# Patient Record
Sex: MALE | Race: WHITE | NOT HISPANIC OR LATINO | Employment: FULL TIME | URBAN - METROPOLITAN AREA
[De-identification: names, ages, dates, MRNs, and addresses within clinical notes are randomized per-mention and may not be internally consistent; named-entity substitution may affect disease eponyms.]

---

## 2023-06-22 ENCOUNTER — LAB (OUTPATIENT)
Dept: LAB | Facility: LAB | Age: 63
End: 2023-06-22
Payer: COMMERCIAL

## 2023-06-22 ENCOUNTER — APPOINTMENT (OUTPATIENT)
Dept: PRIMARY CARE | Facility: CLINIC | Age: 63
End: 2023-06-22
Payer: COMMERCIAL

## 2023-06-22 ENCOUNTER — OFFICE VISIT (OUTPATIENT)
Dept: PRIMARY CARE | Facility: CLINIC | Age: 63
End: 2023-06-22
Payer: COMMERCIAL

## 2023-06-22 VITALS
HEART RATE: 70 BPM | HEIGHT: 66 IN | DIASTOLIC BLOOD PRESSURE: 74 MMHG | OXYGEN SATURATION: 97 % | BODY MASS INDEX: 24.89 KG/M2 | SYSTOLIC BLOOD PRESSURE: 132 MMHG | WEIGHT: 154.9 LBS

## 2023-06-22 DIAGNOSIS — Z00.00 ROUTINE GENERAL MEDICAL EXAMINATION AT A HEALTH CARE FACILITY: Primary | ICD-10-CM

## 2023-06-22 DIAGNOSIS — Z00.00 ROUTINE GENERAL MEDICAL EXAMINATION AT A HEALTH CARE FACILITY: ICD-10-CM

## 2023-06-22 PROBLEM — R53.83 FATIGUE: Status: ACTIVE | Noted: 2023-06-22

## 2023-06-22 PROBLEM — R55 VASOVAGAL SYNCOPE: Status: ACTIVE | Noted: 2021-02-03

## 2023-06-22 PROBLEM — K21.00 HIATAL HERNIA WITH GERD AND ESOPHAGITIS: Status: ACTIVE | Noted: 2023-06-22

## 2023-06-22 PROBLEM — K52.9 CHRONIC DIARRHEA: Status: ACTIVE | Noted: 2021-07-20

## 2023-06-22 PROBLEM — R10.9 ABDOMINAL PAIN: Status: RESOLVED | Noted: 2021-11-15 | Resolved: 2023-06-22

## 2023-06-22 PROBLEM — R35.1 NOCTURIA: Status: RESOLVED | Noted: 2023-06-22 | Resolved: 2023-06-22

## 2023-06-22 PROBLEM — K62.5 RECTAL BLEEDING: Status: RESOLVED | Noted: 2021-07-20 | Resolved: 2023-06-22

## 2023-06-22 PROBLEM — R35.1 NOCTURIA: Status: ACTIVE | Noted: 2023-06-22

## 2023-06-22 PROBLEM — R10.9 ABDOMINAL PAIN: Status: ACTIVE | Noted: 2021-11-15

## 2023-06-22 PROBLEM — Q21.12 PFO (PATENT FORAMEN OVALE) (HHS-HCC): Status: ACTIVE | Noted: 2021-02-03

## 2023-06-22 PROBLEM — D49.89 NEOPLASM OF LEG: Status: ACTIVE | Noted: 2023-06-22

## 2023-06-22 PROBLEM — K62.5 RECTAL BLEEDING: Status: ACTIVE | Noted: 2021-07-20

## 2023-06-22 PROBLEM — K44.9 HIATAL HERNIA WITH GERD AND ESOPHAGITIS: Status: ACTIVE | Noted: 2023-06-22

## 2023-06-22 PROBLEM — D48.9 NEOPLASM OF UNCERTAIN BEHAVIOR: Status: RESOLVED | Noted: 2023-06-22 | Resolved: 2023-06-22

## 2023-06-22 PROBLEM — R20.0 NUMBNESS: Status: RESOLVED | Noted: 2023-06-22 | Resolved: 2023-06-22

## 2023-06-22 PROBLEM — R20.0 NUMBNESS: Status: ACTIVE | Noted: 2023-06-22

## 2023-06-22 PROBLEM — R55 VASOVAGAL SYNCOPE: Status: RESOLVED | Noted: 2021-02-03 | Resolved: 2023-06-22

## 2023-06-22 PROBLEM — D49.89 NEOPLASM OF LEG: Status: RESOLVED | Noted: 2023-06-22 | Resolved: 2023-06-22

## 2023-06-22 PROBLEM — Q21.12 PFO (PATENT FORAMEN OVALE) (HHS-HCC): Status: RESOLVED | Noted: 2021-02-03 | Resolved: 2023-06-22

## 2023-06-22 PROBLEM — K52.9 CHRONIC DIARRHEA: Status: RESOLVED | Noted: 2021-07-20 | Resolved: 2023-06-22

## 2023-06-22 PROBLEM — D48.9 NEOPLASM OF UNCERTAIN BEHAVIOR: Status: ACTIVE | Noted: 2023-06-22

## 2023-06-22 PROBLEM — R53.83 FATIGUE: Status: RESOLVED | Noted: 2023-06-22 | Resolved: 2023-06-22

## 2023-06-22 LAB
ALANINE AMINOTRANSFERASE (SGPT) (U/L) IN SER/PLAS: 11 U/L (ref 10–52)
ALBUMIN (G/DL) IN SER/PLAS: 4.5 G/DL (ref 3.4–5)
ALKALINE PHOSPHATASE (U/L) IN SER/PLAS: 42 U/L (ref 33–136)
ANION GAP IN SER/PLAS: 11 MMOL/L (ref 10–20)
ASPARTATE AMINOTRANSFERASE (SGOT) (U/L) IN SER/PLAS: 15 U/L (ref 9–39)
BILIRUBIN TOTAL (MG/DL) IN SER/PLAS: 0.5 MG/DL (ref 0–1.2)
CALCIUM (MG/DL) IN SER/PLAS: 9 MG/DL (ref 8.6–10.3)
CARBON DIOXIDE, TOTAL (MMOL/L) IN SER/PLAS: 26 MMOL/L (ref 21–32)
CHLORIDE (MMOL/L) IN SER/PLAS: 101 MMOL/L (ref 98–107)
COBALAMIN (VITAMIN B12) (PG/ML) IN SER/PLAS: 486 PG/ML (ref 211–911)
CREATININE (MG/DL) IN SER/PLAS: 1.01 MG/DL (ref 0.5–1.3)
ERYTHROCYTE DISTRIBUTION WIDTH (RATIO) BY AUTOMATED COUNT: 13.7 % (ref 11.5–14.5)
ERYTHROCYTE MEAN CORPUSCULAR HEMOGLOBIN CONCENTRATION (G/DL) BY AUTOMATED: 33.4 G/DL (ref 32–36)
ERYTHROCYTE MEAN CORPUSCULAR VOLUME (FL) BY AUTOMATED COUNT: 88 FL (ref 80–100)
ERYTHROCYTES (10*6/UL) IN BLOOD BY AUTOMATED COUNT: 4.34 X10E12/L (ref 4.5–5.9)
GFR MALE: 84 ML/MIN/1.73M2
GLUCOSE (MG/DL) IN SER/PLAS: 83 MG/DL (ref 74–99)
HEMATOCRIT (%) IN BLOOD BY AUTOMATED COUNT: 38.3 % (ref 41–52)
HEMOGLOBIN (G/DL) IN BLOOD: 12.8 G/DL (ref 13.5–17.5)
LEUKOCYTES (10*3/UL) IN BLOOD BY AUTOMATED COUNT: 5.8 X10E9/L (ref 4.4–11.3)
MAGNESIUM (MG/DL) IN SER/PLAS: 1.94 MG/DL (ref 1.6–2.4)
PLATELETS (10*3/UL) IN BLOOD AUTOMATED COUNT: 262 X10E9/L (ref 150–450)
POTASSIUM (MMOL/L) IN SER/PLAS: 4.4 MMOL/L (ref 3.5–5.3)
PROSTATE SPECIFIC ANTIGEN,SCREEN: 0.45 NG/ML (ref 0–4)
PROTEIN TOTAL: 7 G/DL (ref 6.4–8.2)
SODIUM (MMOL/L) IN SER/PLAS: 134 MMOL/L (ref 136–145)
THYROTROPIN (MIU/L) IN SER/PLAS BY DETECTION LIMIT <= 0.05 MIU/L: 3.38 MIU/L (ref 0.44–3.98)
UREA NITROGEN (MG/DL) IN SER/PLAS: 16 MG/DL (ref 6–23)

## 2023-06-22 PROCEDURE — 84443 ASSAY THYROID STIM HORMONE: CPT

## 2023-06-22 PROCEDURE — 36415 COLL VENOUS BLD VENIPUNCTURE: CPT

## 2023-06-22 PROCEDURE — 80053 COMPREHEN METABOLIC PANEL: CPT

## 2023-06-22 PROCEDURE — 84153 ASSAY OF PSA TOTAL: CPT

## 2023-06-22 PROCEDURE — 82607 VITAMIN B-12: CPT

## 2023-06-22 PROCEDURE — 3075F SYST BP GE 130 - 139MM HG: CPT | Performed by: FAMILY MEDICINE

## 2023-06-22 PROCEDURE — 83735 ASSAY OF MAGNESIUM: CPT

## 2023-06-22 PROCEDURE — 1036F TOBACCO NON-USER: CPT | Performed by: FAMILY MEDICINE

## 2023-06-22 PROCEDURE — 85027 COMPLETE CBC AUTOMATED: CPT

## 2023-06-22 PROCEDURE — 99396 PREV VISIT EST AGE 40-64: CPT | Performed by: FAMILY MEDICINE

## 2023-06-22 PROCEDURE — 3078F DIAST BP <80 MM HG: CPT | Performed by: FAMILY MEDICINE

## 2023-06-22 RX ORDER — TRAZODONE HYDROCHLORIDE 50 MG/1
TABLET ORAL
Qty: 30 TABLET | Refills: 1 | Status: SHIPPED | OUTPATIENT
Start: 2023-06-22

## 2023-06-22 RX ORDER — HYDROGEN PEROXIDE 3 %
20 SOLUTION, NON-ORAL MISCELLANEOUS
COMMUNITY

## 2023-06-22 RX ORDER — ADALIMUMAB 40MG/0.4ML
40 KIT SUBCUTANEOUS
COMMUNITY
Start: 2023-04-03

## 2023-06-22 ASSESSMENT — PATIENT HEALTH QUESTIONNAIRE - PHQ9
1. LITTLE INTEREST OR PLEASURE IN DOING THINGS: NOT AT ALL
2. FEELING DOWN, DEPRESSED OR HOPELESS: NOT AT ALL
SUM OF ALL RESPONSES TO PHQ9 QUESTIONS 1 AND 2: 0

## 2023-06-22 ASSESSMENT — ENCOUNTER SYMPTOMS
NERVOUS/ANXIOUS: 0
HEMATURIA: 0
SLEEP DISTURBANCE: 1
DIZZINESS: 0
FATIGUE: 1
PALPITATIONS: 0
SHORTNESS OF BREATH: 0
DIFFICULTY URINATING: 0
COUGH: 0
CHEST TIGHTNESS: 0
HEADACHES: 0

## 2023-06-22 NOTE — PROGRESS NOTES
"Subjective   Patient ID: Brian Brock Jr. is a 62 y.o. male who presents for Annual Exam.    HPI   Doing better with the ulcerative colitis.  Follows with GI.  Sleep issues going back over a year.  Little trouble falling asleep but the biggest problem is once he wakes and then middle of the night going back to sleep.  No major changes in the last year.  Did have a colonoscopy within the last couple years.  Had basic blood test done a lot in the last 2 years.  TSH PSA were checked January 2021.  No major symptom change, add PSA    Labs today  Start trazodone 1 to 2 pills at bedtime  Office visit 1 month    Review of Systems   Constitutional:  Positive for fatigue.   Eyes:  Negative for visual disturbance.   Respiratory:  Negative for cough, chest tightness and shortness of breath.    Cardiovascular:  Negative for chest pain and palpitations.   Genitourinary:  Negative for difficulty urinating and hematuria.   Skin:  Negative for rash.   Neurological:  Negative for dizziness and headaches.   Psychiatric/Behavioral:  Positive for sleep disturbance. The patient is not nervous/anxious.        Objective   /74   Pulse 70   Ht 1.676 m (5' 6\")   Wt 70.3 kg (154 lb 14.4 oz)   SpO2 97%   BMI 25.00 kg/m²     Physical Exam  Constitutional:       Appearance: Normal appearance.   HENT:      Head: Normocephalic and atraumatic.   Neck:      Thyroid: No thyromegaly.   Cardiovascular:      Rate and Rhythm: Normal rate and regular rhythm.      Heart sounds: Normal heart sounds.   Pulmonary:      Effort: Pulmonary effort is normal.      Breath sounds: Normal breath sounds.   Lymphadenopathy:      Cervical: No cervical adenopathy.   Skin:     General: Skin is warm and dry.   Neurological:      General: No focal deficit present.      Mental Status: He is alert and oriented to person, place, and time.      Gait: Gait normal.   Psychiatric:         Mood and Affect: Mood normal.         Behavior: Behavior normal.         " Thought Content: Thought content normal.         Judgment: Judgment normal.         Assessment/Plan   Problem List Items Addressed This Visit    None  Visit Diagnoses       Routine general medical examination at a health care facility    -  Primary    Relevant Medications    traZODone (Desyrel) 50 mg tablet    Other Relevant Orders    CBC    Comprehensive Metabolic Panel    Thyroid Stimulating Hormone    Vitamin B12    Magnesium    Prostate Specific Antigen, Screen

## 2023-06-26 ENCOUNTER — TELEPHONE (OUTPATIENT)
Dept: PRIMARY CARE | Facility: CLINIC | Age: 63
End: 2023-06-26
Payer: COMMERCIAL

## 2023-07-28 ENCOUNTER — APPOINTMENT (OUTPATIENT)
Dept: PRIMARY CARE | Facility: CLINIC | Age: 63
End: 2023-07-28
Payer: COMMERCIAL

## 2025-04-11 ENCOUNTER — APPOINTMENT (OUTPATIENT)
Age: 65
End: 2025-04-11
Payer: COMMERCIAL

## 2025-04-11 VITALS
BODY MASS INDEX: 24.85 KG/M2 | HEIGHT: 66 IN | DIASTOLIC BLOOD PRESSURE: 80 MMHG | WEIGHT: 154.6 LBS | SYSTOLIC BLOOD PRESSURE: 162 MMHG | OXYGEN SATURATION: 99 % | HEART RATE: 64 BPM

## 2025-04-11 DIAGNOSIS — Q21.12 PATENT FORAMEN OVALE (HHS-HCC): ICD-10-CM

## 2025-04-11 DIAGNOSIS — R00.2 HEART PALPITATIONS: Primary | ICD-10-CM

## 2025-04-11 DIAGNOSIS — I10 ESSENTIAL HYPERTENSION: ICD-10-CM

## 2025-04-11 DIAGNOSIS — R35.1 NOCTURIA: ICD-10-CM

## 2025-04-11 PROCEDURE — 3008F BODY MASS INDEX DOCD: CPT | Performed by: FAMILY MEDICINE

## 2025-04-11 PROCEDURE — 3077F SYST BP >= 140 MM HG: CPT | Performed by: FAMILY MEDICINE

## 2025-04-11 PROCEDURE — 3079F DIAST BP 80-89 MM HG: CPT | Performed by: FAMILY MEDICINE

## 2025-04-11 PROCEDURE — 1036F TOBACCO NON-USER: CPT | Performed by: FAMILY MEDICINE

## 2025-04-11 PROCEDURE — 99214 OFFICE O/P EST MOD 30 MIN: CPT | Performed by: FAMILY MEDICINE

## 2025-04-11 ASSESSMENT — PATIENT HEALTH QUESTIONNAIRE - PHQ9
2. FEELING DOWN, DEPRESSED OR HOPELESS: NOT AT ALL
SUM OF ALL RESPONSES TO PHQ9 QUESTIONS 1 AND 2: 0
1. LITTLE INTEREST OR PLEASURE IN DOING THINGS: NOT AT ALL

## 2025-04-11 ASSESSMENT — ENCOUNTER SYMPTOMS
COUGH: 0
PALPITATIONS: 1
BLOOD IN STOOL: 0
CONSTIPATION: 0
FATIGUE: 0
SHORTNESS OF BREATH: 0
DIFFICULTY URINATING: 0
SLEEP DISTURBANCE: 0
ABDOMINAL PAIN: 0
HEMATURIA: 0
NERVOUS/ANXIOUS: 0

## 2025-04-11 NOTE — PROGRESS NOTES
"Subjective   Patient ID: Brian Brock Jr. is a 64 y.o. male who presents for Follow-up (Poss inguinal hernia).    HPI   Possible atrial fibrillation during the colonoscopy none since.  Does have occasional palpitations but they have not gotten more frequent or more serious.  No change in exercise tolerance.  Troubles before the heart workup did have a patent foramen ovale.    Labs in the ER were okay.    Also worried about an inguinal hernia, there was nothing on the CT was 2023.  Might of happened since then.  Not bothering him too much.  But he might need a surgical repair should have cardiology take a look at his heart.    7-day event monitor for heart palpitations and possible A-fib.  Echocardiogram  Referral to cardiology    Really will check a CBC CMP TSH PSA no major symptom change for the PSA      Review of Systems   Constitutional:  Negative for fatigue.   Respiratory:  Negative for cough and shortness of breath.    Cardiovascular:  Positive for palpitations. Negative for chest pain and leg swelling.   Gastrointestinal:  Negative for abdominal pain, blood in stool and constipation.   Genitourinary:  Negative for difficulty urinating and hematuria.   Skin:  Negative for rash.   Psychiatric/Behavioral:  Negative for sleep disturbance. The patient is not nervous/anxious.        Objective   /80   Pulse 64   Ht 1.676 m (5' 6\")   Wt 70.1 kg (154 lb 9.6 oz)   SpO2 99%   BMI 24.95 kg/m²     Physical Exam  Constitutional:       Appearance: Normal appearance.   HENT:      Head: Normocephalic and atraumatic.   Cardiovascular:      Rate and Rhythm: Normal rate and regular rhythm.      Heart sounds: Normal heart sounds. No murmur heard.  Pulmonary:      Effort: Pulmonary effort is normal.      Breath sounds: Normal breath sounds.   Skin:     General: Skin is warm and dry.   Neurological:      General: No focal deficit present.      Mental Status: He is alert and oriented to person, place, and time. "   Psychiatric:         Mood and Affect: Mood normal.         Behavior: Behavior normal.         Thought Content: Thought content normal.         Judgment: Judgment normal.         Assessment/Plan   Problem List Items Addressed This Visit             ICD-10-CM    Essential hypertension I10    Relevant Orders    Transthoracic echo (TTE) complete    Holter Or Event Cardiac Monitor    Referral to Cardiology    [facility]    Comprehensive Metabolic Panel    Thyroid Stimulating Hormone    Heart palpitations - Primary R00.2    Relevant Orders    Transthoracic echo (TTE) complete    Holter Or Event Cardiac Monitor    Referral to Cardiology    [facility]    Comprehensive Metabolic Panel    Thyroid Stimulating Hormone    Patent foramen ovale ([facility]) Q21.12    Relevant Orders    Transthoracic echo (TTE) complete    Holter Or Event Cardiac Monitor    Referral to Cardiology     Other Visit Diagnoses         Codes    Nocturia     R35.1    Relevant Orders    Prostate Specific Antigen

## 2025-04-12 LAB
ALBUMIN SERPL-MCNC: 4.7 G/DL (ref 3.6–5.1)
ALP SERPL-CCNC: 44 U/L (ref 35–144)
ALT SERPL-CCNC: 9 U/L (ref 9–46)
ANION GAP SERPL CALCULATED.4IONS-SCNC: 9 MMOL/L (CALC) (ref 7–17)
AST SERPL-CCNC: 13 U/L (ref 10–35)
BILIRUB SERPL-MCNC: 0.5 MG/DL (ref 0.2–1.2)
BUN SERPL-MCNC: 12 MG/DL (ref 7–25)
CALCIUM SERPL-MCNC: 9.4 MG/DL (ref 8.6–10.3)
CHLORIDE SERPL-SCNC: 95 MMOL/L (ref 98–110)
CO2 SERPL-SCNC: 28 MMOL/L (ref 20–32)
CREAT SERPL-MCNC: 0.89 MG/DL (ref 0.7–1.35)
EGFRCR SERPLBLD CKD-EPI 2021: 96 ML/MIN/1.73M2
ERYTHROCYTE [DISTWIDTH] IN BLOOD BY AUTOMATED COUNT: 13.2 % (ref 11–15)
GLUCOSE SERPL-MCNC: 85 MG/DL (ref 65–139)
HCT VFR BLD AUTO: 39.4 % (ref 38.5–50)
HGB BLD-MCNC: 13.1 G/DL (ref 13.2–17.1)
MCH RBC QN AUTO: 29.7 PG (ref 27–33)
MCHC RBC AUTO-ENTMCNC: 33.2 G/DL (ref 32–36)
MCV RBC AUTO: 89.3 FL (ref 80–100)
PLATELET # BLD AUTO: 303 THOUSAND/UL (ref 140–400)
PMV BLD REES-ECKER: 11.1 FL (ref 7.5–12.5)
POTASSIUM SERPL-SCNC: 4.9 MMOL/L (ref 3.5–5.3)
PROT SERPL-MCNC: 7.6 G/DL (ref 6.1–8.1)
PSA SERPL-MCNC: 0.69 NG/ML
RBC # BLD AUTO: 4.41 MILLION/UL (ref 4.2–5.8)
SODIUM SERPL-SCNC: 132 MMOL/L (ref 135–146)
TSH SERPL-ACNC: 3.74 MIU/L (ref 0.4–4.5)
WBC # BLD AUTO: 7.4 THOUSAND/UL (ref 3.8–10.8)

## 2025-04-18 ENCOUNTER — TELEPHONE (OUTPATIENT)
Age: 65
End: 2025-04-18

## 2025-04-18 ENCOUNTER — HOSPITAL ENCOUNTER (OUTPATIENT)
Dept: CARDIOLOGY | Facility: HOSPITAL | Age: 65
Discharge: HOME | End: 2025-04-18
Payer: COMMERCIAL

## 2025-04-18 VITALS — RESPIRATION RATE: 16 BRPM | DIASTOLIC BLOOD PRESSURE: 100 MMHG | HEART RATE: 81 BPM | SYSTOLIC BLOOD PRESSURE: 177 MMHG

## 2025-04-18 DIAGNOSIS — I48.0 PAROXYSMAL ATRIAL FIBRILLATION (MULTI): Primary | ICD-10-CM

## 2025-04-18 DIAGNOSIS — I10 ESSENTIAL HYPERTENSION: ICD-10-CM

## 2025-04-18 DIAGNOSIS — R00.2 HEART PALPITATIONS: ICD-10-CM

## 2025-04-18 DIAGNOSIS — Q21.12 PATENT FORAMEN OVALE (HHS-HCC): ICD-10-CM

## 2025-04-18 LAB
AORTIC VALVE MEAN GRADIENT: 5 MMHG
AORTIC VALVE PEAK VELOCITY: 1.53 M/S
AV PEAK GRADIENT: 9 MMHG
AVA (PEAK VEL): 2.22 CM2
AVA (VTI): 2.68 CM2
EJECTION FRACTION APICAL 4 CHAMBER: 59.4
EJECTION FRACTION: 63 %
LEFT ATRIUM VOLUME AREA LENGTH INDEX BSA: 21.8 ML/M2
LEFT VENTRICLE INTERNAL DIMENSION DIASTOLE: 4.49 CM (ref 3.5–6)
LEFT VENTRICULAR OUTFLOW TRACT DIAMETER: 2 CM
LV EJECTION FRACTION BIPLANE: 58 %
Q ONSET: 221 MS
QRS COUNT: 15 BEATS
QRS DURATION: 88 MS
QT INTERVAL: 358 MS
QTC CALCULATION(BAZETT): 433 MS
QTC FREDERICIA: 406 MS
R AXIS: 19 DEGREES
RIGHT VENTRICLE PEAK SYSTOLIC PRESSURE: 23.1 MMHG
T AXIS: 56 DEGREES
T OFFSET: 400 MS
VENTRICULAR RATE: 88 BPM

## 2025-04-18 PROCEDURE — 93242 EXT ECG>48HR<7D RECORDING: CPT

## 2025-04-18 PROCEDURE — 9420000001 HC RT PATIENT EDUCATION 5 MIN

## 2025-04-18 PROCEDURE — 93306 TTE W/DOPPLER COMPLETE: CPT | Performed by: STUDENT IN AN ORGANIZED HEALTH CARE EDUCATION/TRAINING PROGRAM

## 2025-04-18 PROCEDURE — 93306 TTE W/DOPPLER COMPLETE: CPT

## 2025-04-18 PROCEDURE — 93010 ELECTROCARDIOGRAM REPORT: CPT | Performed by: STUDENT IN AN ORGANIZED HEALTH CARE EDUCATION/TRAINING PROGRAM

## 2025-04-18 RX ORDER — APIXABAN 5 MG (74)
KIT ORAL
Qty: 74 TABLET | Refills: 0 | Status: SHIPPED | OUTPATIENT
Start: 2025-04-18

## 2025-04-18 RX ORDER — METOPROLOL SUCCINATE 25 MG/1
25 TABLET, EXTENDED RELEASE ORAL DAILY
Qty: 30 TABLET | Refills: 11 | Status: SHIPPED | OUTPATIENT
Start: 2025-04-18 | End: 2026-04-18

## 2025-04-18 NOTE — PROGRESS NOTES
Went to go over the echocardiogram did an EKG that shows atrial fibrillation.  Need to start him on a low-dose beta-blocker and Eliquis starter pack  Call the patient and see if he is okay starting these things today.    Have a follow-up with cardiology next week.

## 2025-04-18 NOTE — TELEPHONE ENCOUNTER
Spoke with patient and let him know that beta blocker was fine per ptf but we will keep an eye on blood thinner and colitis.

## 2025-04-18 NOTE — NURSING NOTE
Patient arrived for an Echo and a holter monitor.  The  echo tech noticed the patient was in a-fib.  Jory the RN contacted Dr. Freed's office to get an order for an EKG.  An EKG was performed on the patient.  EKG was faxed to Dr. Freed's office and the office was contacted via phone.  I spoke with Dr. Freed's assistant and they wanted the patient to be seen by cardiology today.  I called cardiology to get him a stat appointment today, but no one was in the office to see the patient.  Patient was informed of the risks of not being treated for A-fib and was sent home with reading material about A-fib.   Patient refused to go to the emergency room.   Dr. Freed was contacted via secure chat to update him on the patient not being able to get into cardiology today and that the patient refused to go to the ER.  Dr. López was also contacted via secure chat to read the EKG in [facility].  EKG was read and the read copy was sent to Dr. Freed's office.

## 2025-04-18 NOTE — TELEPHONE ENCOUNTER
Spoke to patient about low dose beta blocker and eliquis. He is in agree-ance. But wants to be sure this wont effect his ulcerative colitis because he does bleed sometimes with that....    Please advise

## 2025-04-18 NOTE — NURSING NOTE
Patient came in for an Echo and holter monitor.  The echo tech noticed the patient was in A-fib.  Jory the RN contacted Dr. Freed's office to see about getting an EKG.  An EKG was performed.  EKG was faxed to Dr. Freed's office and was also contacted.  I spoke with Dr. Freed's medical assistant  and updated her of the patient's results.  Dr. Freed's office want the patient to be seen by cardiology today.  Cardiology's office was contacted to get the patient a STAT visit.  The cardiology office had no one available to see the patient today.  The patient was encouraged to go the emergency room.  Jory ralph RN and myself talked to the patient of the risks from not treating A-fib.  Patient refused to go to the emergency room.  Patient was sent home with information about A-fib.

## 2025-04-22 DIAGNOSIS — I48.0 PAROXYSMAL ATRIAL FIBRILLATION (MULTI): ICD-10-CM

## 2025-04-23 ENCOUNTER — TELEPHONE (OUTPATIENT)
Age: 65
End: 2025-04-23
Payer: COMMERCIAL

## 2025-04-23 ENCOUNTER — ANTICOAGULATION - OTHER VISIT (DOAC) (OUTPATIENT)
Dept: PHARMACY | Facility: HOSPITAL | Age: 65
End: 2025-04-23
Payer: COMMERCIAL

## 2025-04-23 DIAGNOSIS — I48.0 PAROXYSMAL ATRIAL FIBRILLATION (MULTI): Primary | ICD-10-CM

## 2025-04-23 PROCEDURE — RXMED WILLOW AMBULATORY MEDICATION CHARGE

## 2025-04-23 NOTE — PROGRESS NOTES
Pt enrolled in [facility] for management of Paroxysmal atrial fibrillation (Multi) [I48.0].     Current anticoagulant:  new start    Last Creatinine:   Lab Results   Component Value Date    CREATININE 0.89 04/11/2025     Last hemoglobin/hematocrit:  Lab Results   Component Value Date    HGB 13.1 (L) 04/11/2025     Lab Results   Component Value Date    HCT 39.4 04/11/2025       Pt was recently found to have a fib. He was recommended to start Eliquis. However, it looks like this might require PA. He was told RX would be $712. That seems like the cash price, so I will send RX to [facility] for PA team to review. HE has private insurance so this may be covered down to $10. We can use the 30-day free trial as we await more info from PA team.    I will follow up with pt once we know more.    Bal Gary, PharmD  P:[phone]  F:[phone]

## 2025-04-25 ENCOUNTER — PHARMACY VISIT (OUTPATIENT)
Dept: PHARMACY | Facility: CLINIC | Age: 65
End: 2025-04-25
Payer: COMMERCIAL

## 2025-04-28 ENCOUNTER — APPOINTMENT (OUTPATIENT)
Dept: CARDIOLOGY | Facility: CLINIC | Age: 65
End: 2025-04-28
Payer: COMMERCIAL

## 2025-04-28 VITALS
WEIGHT: 154 LBS | HEIGHT: 66 IN | SYSTOLIC BLOOD PRESSURE: 135 MMHG | HEART RATE: 76 BPM | BODY MASS INDEX: 24.75 KG/M2 | OXYGEN SATURATION: 96 % | DIASTOLIC BLOOD PRESSURE: 80 MMHG

## 2025-04-28 DIAGNOSIS — I10 ESSENTIAL HYPERTENSION: ICD-10-CM

## 2025-04-28 DIAGNOSIS — Q21.12 PATENT FORAMEN OVALE (HHS-HCC): ICD-10-CM

## 2025-04-28 DIAGNOSIS — I65.23 CALCIFICATION OF BOTH CAROTID ARTERIES: ICD-10-CM

## 2025-04-28 DIAGNOSIS — R00.2 HEART PALPITATIONS: ICD-10-CM

## 2025-04-28 DIAGNOSIS — R06.09 DOE (DYSPNEA ON EXERTION): ICD-10-CM

## 2025-04-28 DIAGNOSIS — I48.19 PERSISTENT ATRIAL FIBRILLATION (MULTI): Primary | ICD-10-CM

## 2025-04-28 PROCEDURE — 99204 OFFICE O/P NEW MOD 45 MIN: CPT

## 2025-04-28 PROCEDURE — 3008F BODY MASS INDEX DOCD: CPT

## 2025-04-28 PROCEDURE — 1036F TOBACCO NON-USER: CPT

## 2025-04-28 PROCEDURE — 3079F DIAST BP 80-89 MM HG: CPT

## 2025-04-28 PROCEDURE — 3075F SYST BP GE 130 - 139MM HG: CPT

## 2025-04-28 NOTE — PROGRESS NOTES
[facility]  Cardiology Clinic Visit Note    History of present illness:  This is a 64 y.o. male with a history of hypertension, carotid artery diseae and atrial fibrillation who presents to the clinic referred by PCP Dr. Freed for palpitations and possible atrial fibrillation.    PMHx/PSHx: As above  Tobacco Denies, Alcohol Denies, Caffeine use   1-2 cups of coffee /day, Drug use  Denies  FamHx: Brother had CABG in his 50s.     He underwent a routine colonoscopy on 3/28/2025 and reportedly he was seen to have atrial fibrillation with rapid reticular rate on cardiac monitor in PACU.  After recovery he was sent to the emergency department for evaluation.  ECG from ER reported sinus bradycardia with heart rate 56 bpm.  He was recommended that he be observed in the hospital the patient signed out AGAINST MEDICAL ADVICE.    Subjective:  He reports palpitations and fast heart rates few times a weeks, occ dizzy and lightheaded.  Denies chest pain or pressure.  Gets breath at times physical exertion.  States it slowly been more difficult to perform his job duties which are very physically demanding.    Cardiac Testing  Echo (April 2025): LVEF 60 to 60% with no wall motion abnormalities, normal RV size and function, normal biatrial size.  Negative bubble study.  Mildly thickened mitral valve with mild MR.  Trivial pericardial effusion.  7-day Holter (April 2025): Pending  [id number] Score  Age <65: 0   Sex Male: 0   CHF History No: 0   HTN Yes: 1   Stroke/TIA/Thromboembolism No: 0   Vascular Dz: CAD/PAD/Aortic Plaque Yes: 1   DM No: 0   Total Score 2     Assessment and Plan  Paroxysmal atrial fibrillation  -AF Dx History: April 2025 by ECG.  Asymptomatic.; h/o Cardioversion: No; AAD Use: None; Anticoagulation use: Apixaban 5mg BID (current); h/o Ablation: none; TAB2TF0-QBRj Score: 2  - A-fib echo exam today  - Will appreciate the results of his 7-day Holter monitor for A-fib burden  - I believe rhythm  control will be a great option for him as this is relatively new and he has a lack of A-fib risk factors. Will obtain a Cardiolite stress test will rule out the presence of myocardial ischemia and proceed with antiarrhythmic therapy with flecainide.  If he remains in persistent A-fib we will attempt direct-current cardioversion as well.  - Continue metoprolol succinate 25 mg daily  - Continue Eliquis 5 mg twice a day for primary stroke prevention.    Essential hypertension  - Mildly elevated blood pressure in the office today  - Recommended home monitoring before we start antihypertensives.    Patent foramen ovale  - Bidirectional shunt with bubble study reported negative bubble study on echo on previous echo January 2021  - Negative bubble study on most recent echo  - Usually benign, these are not closed in the absence of severe asymptomatic left-to-right shunting or cryptogenic stroke.  - No further action needed.    Carotid artery calcification  - Calcified plaque seen on carotid duplex study.  Less than 50% stenosis bilaterally internal carotid artery  - Obtain fasting lipid panel    Return to Care:  After testing    Objective  VITALS  Vitals:    04/28/25 0854   BP: 135/80   Pulse: 76   SpO2: 96%       Weight  Vitals:    04/28/25 0854   Weight: 69.9 kg (154 lb)       Past Medical History  Medical History[1]    Past Surgical History  Surgical History[2]    Medications  Current Outpatient Medications   Medication Instructions    Eliquis 5 mg, oral, 2 times daily    esomeprazole (NEXIUM) 20 mg    metoprolol succinate XL (TOPROL-XL) 25 mg, oral, Daily, Do not crush or chew.       Allergies  Allergies[3]    Social History  Social History[4]    Family History  Family History[5]        PHYSICAL EXAM  Physical Exam  Vitals and nursing note reviewed.   Constitutional:       General: He is not in acute distress.  HENT:      Head: Normocephalic and atraumatic.      Mouth/Throat:      Mouth: Mucous membranes are moist.       Pharynx: Oropharynx is clear.   Eyes:      General: No scleral icterus.     Pupils: Pupils are equal, round, and reactive to light.   Cardiovascular:      Rate and Rhythm: Normal rate. Rhythm irregular.      Pulses: Normal pulses.      Heart sounds: Normal heart sounds, S1 normal and S2 normal. No murmur heard.     No friction rub.   Pulmonary:      Effort: Pulmonary effort is normal.      Breath sounds: Normal breath sounds.   Abdominal:      General: Bowel sounds are normal. There is no distension.      Palpations: Abdomen is soft.      Tenderness: There is no abdominal tenderness.   Musculoskeletal:         General: Normal range of motion.      Cervical back: Normal range of motion and neck supple.      Right lower leg: No edema.      Left lower leg: No edema.   Skin:     General: Skin is warm and dry.      Capillary Refill: Capillary refill takes less than 2 seconds.      Findings: No rash.   Neurological:      General: No focal deficit present.      Mental Status: He is alert.   Psychiatric:         Mood and Affect: Mood normal.         Behavior: Behavior normal.       Cardiovascular Labs  Lab Results   Component Value Date    HGB 13.1 (L) 04/11/2025    HGB 12.8 (L) 06/22/2023    HGB 11.8 (L) 12/01/2021     04/11/2025    WBC 7.4 04/11/2025     (L) 04/11/2025    K 4.9 04/11/2025    CREATININE 0.89 04/11/2025    CREATININE 1.01 06/22/2023    CREATININE 0.77 12/01/2021    BUN 12 04/11/2025    CALCIUM 9.4 04/11/2025       Echocardiogram  Results for orders placed during the hospital encounter of 04/18/25    Transthoracic echo ([place] [address]  Phone [phone], Fax [phone]    TRANSTHORACIC ECHOCARDIOGRAM REPORT    Patient Name:       JE Wang Physician:    [id number] Daniel Armstrong MD  Study Date:         4/18/2025           Ordering Provider:    [id number] ALEX RAND  MRN/PID:             [id number]            Fellow:  Accession#:         [id number]        Nurse:                Jory Waterman RN  Date of Birth/Age:  1960 / 64      Sonographer:          Guille Moreau RDCS  years  Gender Assigned at  M                   Additional Staff:  Birth:  Height:             167.64 cm           Admit Date:  Weight:             69.85 kg            Admission Status:     Outpatient  BSA / BMI:          1.79 m2 / 24.86     Department Location:  [facility]  kg/m2  Blood Pressure: 177 /100 mmHg    Study Type:    TRANSTHORACIC ECHO (TTE) COMPLETE  Diagnosis/ICD: Essential (primary) hypertension-I10; Patent foramen ovale-Q21.12  CPT Codes:     Echo Complete w Full Doppler-02317  Study Detail: The following Echo studies were performed: 2D, M-Mode, Doppler and  color flow. Agitated saline used as a contrast agent for  intraseptal flow evaluation.      PHYSICIAN INTERPRETATION:  Left Ventricle: The left ventricular systolic function is normal, with a visually estimated ejection fraction of 60-65%. There are no regional wall motion abnormalities. The left ventricular cavity size is normal. There is mild increased septal and normal posterior left ventricular wall thickness. Spectral Doppler shows a normal pattern of left ventricular diastolic filling.  Left Atrium: The left atrial size is normal. A bubble study using agitated saline was performed. Bubble study is negative.  Right Ventricle: The right ventricle is normal in size. There is normal right ventricular global systolic function.  Right Atrium: The right atrial size is normal.  Aortic Valve: The aortic valve is trileaflet. The aortic valve dimensionless index is 0.85. There is trace aortic valve regurgitation. The peak instantaneous gradient of the aortic valve is 9 mmHg. The mean gradient of the aortic valve is 5 mmHg.  Mitral Valve: The mitral valve is mildly thickened. There is mild mitral valve regurgitation.  Tricuspid Valve: The tricuspid valve is  structurally normal. There is trace tricuspid regurgitation. The Doppler estimated RVSP is within normal limits at 23.1 mmHg.  Pulmonic Valve: The pulmonic valve is structurally normal. There is physiologic pulmonic valve regurgitation.  Pericardium: Trivial pericardial effusion.  Aorta: The aortic root is normal.  In comparison to the previous echocardiogram(s): Compared with study dated 2021, no significant change.      CONCLUSIONS:  1. The left ventricular systolic function is normal, with a visually estimated ejection fraction of 60-65%.  2. There is normal right ventricular global systolic function.  3. Right ventricular systolic pressure is within normal limits.    QUANTITATIVE DATA SUMMARY:    2D MEASUREMENTS:             Normal Ranges:  Ao Root d:       3.50 cm     (2.0-3.7cm)  IVSd:            1.13 cm     (0.6-1.1cm)  LVPWd:           0.70 cm     (0.6-1.1cm)  LVIDd:           4.49 cm     (3.9-5.9cm)  LVIDs:           2.88 cm  LV Mass Index:   75.4 g/m2  LVEDV Index:     54.15 ml/m2  LV % FS          35.9 %      LEFT ATRIUM:                  Normal Ranges:  LA Vol A4C:        33.0 ml    (22+/-6mL/m2)  LA Vol A2C:        43.3 ml  LA Vol BP:         39.0 ml  LA Vol Index A4C:  18.4ml/m2  LA Vol Index A2C:  24.2 ml/m2  LA Vol Index BP:   21.8 ml/m2  LA Area A4C:       13.7 cm2  LA Area A2C:       15.2 cm2  LA Major Axis A4C: 4.8 cm  LA Major Axis A2C: 4.5 cm  LA Volume Index:   16.9 ml/m2  LA Vol A4C:        30.0 ml  LA Vol A2C:        41.6 ml  LA Vol Index BSA:  20.0 ml/m2      LV SYSTOLIC FUNCTION:  Normal Ranges:  EF-A4C View:    59 % (>=55%)  EF-A2C View:    58 %  EF-Biplane:     58 %  EF-Visual:      63 %  LV EF Reported: 63 %      AORTIC VALVE:                     Normal Ranges:  AoV Vmax:                1.53 m/s (<=1.7m/s)  AoV Peak P.4 mmHg (<20mmHg)  AoV Mean P.0 mmHg (1.7-11.5mmHg)  LVOT Max Capo:            1.08 m/s (<=1.1m/s)  AoV VTI:                 25.40 cm  (18-25cm)  LVOT VTI:                21.70 cm  LVOT Diameter:           2.00 cm  (1.8-2.4cm)  AoV Area, VTI:           2.68 cm2 (2.5-5.5cm2)  AoV Area,Vmax:           2.22 cm2 (2.5-4.5cm2)  AoV Dimensionless Index: 0.85      RIGHT VENTRICLE:  RV Basal 4.14 cm  RV Mid   2.60 cm  RV Major 7.6 cm      TRICUSPID VALVE/RVSP:          Normal Ranges:  Peak TR Velocity:     2.24 m/s  RV Syst Pressure:     23 mmHg  (< 30mmHg)      [id number] Daniel Armstrong MD  Electronically signed on 4/18/2025 at 2:08:54 PM        ** Final **       The ASCVD Risk score (Davis DK, et al., 2019) failed to calculate for the following reasons:    Cannot find a previous HDL lab    Cannot find a previous total cholesterol lab  Low Risk: <5%  Borderline Risk: 5%-7.4%  Intermediate Risk: 7.5% - 19.9%  High Risk: >20%    If your symptoms worsen or progress please go directory to your nearest emergency department for evaluation.     Thank you for this interesting clinical case and allowing me to participate in the care of this patient. Please reach me out if you have any questions or if you need any clarifications regarding this patient's care.    **Disclaimer: This note was dictated by speech recognition, and every effort has been made to prevent any error in transcription, however minor errors may be present**  ___________________________________________________  Eron Restrepo, MSN, CNP, ACNPC, CCRN  Advanced Practice Provider, Nurse Practitioner  Division of Cardiovascular Medicine  [facility]  [facility]         [1]   Past Medical History:  Diagnosis Date    Palpitations 01/23/2021    Heart palpitations    PFO (patent foramen ovale) ([facility]) 02/03/2021    Last Assessment & Plan: Formatting of this note might be different from the original. Incidental finding noted on echocardiogram.  We reviewed indications to fix it.  He does not need any of these requirements.  We talked about  aspirin but given his ulcerative colitis, I chose not to initiate him on it.    Ulcerative colitis, unspecified, without complications (Multi) 12/01/2021    Ulcerative colitis   [2]   Past Surgical History:  Procedure Laterality Date    COLONOSCOPY W/ BIOPSIES  03/28/2025    dr. lozano    OTHER SURGICAL HISTORY  01/15/2021    Tonsillectomy    OTHER SURGICAL HISTORY  01/15/2021    Inguinal hernia repair   [3] No Known Allergies  [4]   Social History  Tobacco Use    Smoking status: Never    Smokeless tobacco: Never   Substance Use Topics    Alcohol use: Never    Drug use: Never   [5]   Family History  Problem Relation Name Age of Onset    Other (FOLLICULAR LYMPHOMA) Mother      Dementia Father

## 2025-05-07 LAB
CHOLEST SERPL-MCNC: 262 MG/DL
CHOLEST/HDLC SERPL: 4.7 (CALC)
HDLC SERPL-MCNC: 56 MG/DL
LDLC SERPL CALC-MCNC: 179 MG/DL (CALC)
NONHDLC SERPL-MCNC: 206 MG/DL (CALC)
TRIGL SERPL-MCNC: 136 MG/DL

## 2025-05-09 ENCOUNTER — HOSPITAL ENCOUNTER (OUTPATIENT)
Dept: RADIOLOGY | Facility: HOSPITAL | Age: 65
Discharge: HOME | End: 2025-05-09
Payer: COMMERCIAL

## 2025-05-09 ENCOUNTER — HOSPITAL ENCOUNTER (OUTPATIENT)
Dept: CARDIOLOGY | Facility: HOSPITAL | Age: 65
Discharge: HOME | End: 2025-05-09
Payer: COMMERCIAL

## 2025-05-09 VITALS
WEIGHT: 154 LBS | HEART RATE: 68 BPM | HEIGHT: 66 IN | DIASTOLIC BLOOD PRESSURE: 89 MMHG | SYSTOLIC BLOOD PRESSURE: 157 MMHG | BODY MASS INDEX: 24.75 KG/M2

## 2025-05-09 DIAGNOSIS — R06.09 DOE (DYSPNEA ON EXERTION): ICD-10-CM

## 2025-05-09 DIAGNOSIS — I48.19 PERSISTENT ATRIAL FIBRILLATION (MULTI): ICD-10-CM

## 2025-05-09 PROCEDURE — A9502 TC99M TETROFOSMIN: HCPCS

## 2025-05-09 PROCEDURE — 3430000001 HC RX 343 DIAGNOSTIC RADIOPHARMACEUTICALS

## 2025-05-09 PROCEDURE — 78452 HT MUSCLE IMAGE SPECT MULT: CPT

## 2025-05-09 PROCEDURE — 93017 CV STRESS TEST TRACING ONLY: CPT

## 2025-05-09 RX ADMIN — TETROFOSMIN 10.3 MILLICURIE: 0.23 INJECTION, POWDER, LYOPHILIZED, FOR SOLUTION INTRAVENOUS at 08:15

## 2025-05-09 RX ADMIN — TETROFOSMIN 34 MILLICURIE: 0.23 INJECTION, POWDER, LYOPHILIZED, FOR SOLUTION INTRAVENOUS at 09:45

## 2025-05-16 ENCOUNTER — APPOINTMENT (OUTPATIENT)
Dept: CARDIOLOGY | Facility: CLINIC | Age: 65
End: 2025-05-16
Payer: COMMERCIAL

## 2025-05-16 VITALS
WEIGHT: 154 LBS | OXYGEN SATURATION: 97 % | DIASTOLIC BLOOD PRESSURE: 70 MMHG | HEIGHT: 66 IN | BODY MASS INDEX: 24.75 KG/M2 | HEART RATE: 55 BPM | SYSTOLIC BLOOD PRESSURE: 142 MMHG

## 2025-05-16 DIAGNOSIS — Q21.12 PATENT FORAMEN OVALE (HHS-HCC): ICD-10-CM

## 2025-05-16 DIAGNOSIS — I10 ESSENTIAL HYPERTENSION: ICD-10-CM

## 2025-05-16 DIAGNOSIS — I49.5 TACHYCARDIA-BRADYCARDIA SYNDROME (MULTI): ICD-10-CM

## 2025-05-16 DIAGNOSIS — I65.23 CALCIFICATION OF BOTH CAROTID ARTERIES: ICD-10-CM

## 2025-05-16 DIAGNOSIS — E78.00 PURE HYPERCHOLESTEROLEMIA: ICD-10-CM

## 2025-05-16 DIAGNOSIS — I48.0 PAROXYSMAL ATRIAL FIBRILLATION (MULTI): Primary | ICD-10-CM

## 2025-05-16 PROCEDURE — 3077F SYST BP >= 140 MM HG: CPT

## 2025-05-16 PROCEDURE — 1036F TOBACCO NON-USER: CPT

## 2025-05-16 PROCEDURE — 99214 OFFICE O/P EST MOD 30 MIN: CPT

## 2025-05-16 PROCEDURE — 3008F BODY MASS INDEX DOCD: CPT

## 2025-05-16 PROCEDURE — 3078F DIAST BP <80 MM HG: CPT

## 2025-05-16 RX ORDER — ROSUVASTATIN CALCIUM 20 MG/1
20 TABLET, COATED ORAL DAILY
Qty: 30 TABLET | Refills: 11 | Status: SHIPPED | OUTPATIENT
Start: 2025-05-16 | End: 2026-05-16

## 2025-05-16 NOTE — PROGRESS NOTES
[facility]  Cardiology Clinic Visit Note    History of present illness:  This is a 64 y.o. male with a history of hypertension, carotid artery diseae and atrial fibrillation who presents to the clinic testing follow up.     PMHx/PSHx: Works as a   Tobacco Denies, Alcohol Denies, Caffeine use  1-2 cups of coffee /day, Drug use  Denies  FamHx: Brother had CABG in his 50s.     He underwent a routine colonoscopy on 3/28/2025 and reportedly he was seen to have atrial fibrillation with rapid reticular rate on cardiac monitor in PACU.  After recovery he was sent to the emergency department for evaluation.  ECG from ER reported sinus bradycardia with heart rate 56 bpm.  He was recommended that he be observed in the hospital the patient signed out AGAINST MEDICAL ADVICE.    I saw him as a new patient on 4/28/2025.  He reported palpitations and fast heart rates several with some dizziness and lightheadedness with occasional dizziness and lightheadedness.  Also reported it slowly been more difficult to perform his job duties which are physically demanding.  He completed a 7-day Holter monitor study and results are pending.  He has a history of carotid artery calcification so I elected to update his fasting lipid panel.  I obtain a nuclear stress test in order to proceed with antiarrhythmic therapy with flecainide.    Subjective:  Symptoms remain unchanged since previous visit. He reports palpitations and fast heart rates few times a weeks, occasional dizzy and lightheaded. Some shortness of breath with exertion. Denies chest pain or pressure.   Cardiac Testing  Myocardial SPECT (May 2025): Overall normal perfusion study negative for ischemia or prior infarct.  There was a perfusion defect seen in inferior wall during rest and stress imaging and improved on the attenuated images which likely represents artifact.  Echo (April 2025): LVEF 60 to 60% with no wall motion abnormalities, normal  RV size and function, normal biatrial size.  Negative bubble study.  Mildly thickened mitral valve with mild MR.  Trivial pericardial effusion.  7-day Holter (April 2025): Predominantly sinus rhythm with an average heart rate of 72 bpm with a range of 175 to 24 bpm.  There were 2 sinus pauses the longest lasting 3.4 seconds at approximately 11 AM on 4/20/2025 followed by what appears to be a junctional rhythm. PAC and PVC burden less than 1%.  33% A-fib burden all in RVR range.  [id number] Score  Age <65: 0   Sex Male: 0   CHF History No: 0   HTN Yes: 1   Stroke/TIA/Thromboembolism No: 0   Vascular Dz: CAD/PAD/Aortic Plaque Yes: 1   DM No: 0   Total Score 2     Assessment and Plan  Paroxysmal atrial fibrillation  Tachycardia-bradycardia syndrome  -AF Dx History: April 2025 by ECG.  Asymptomatic.; h/o Cardioversion: No; AAD Use: None; Anticoagulation use: Apixaban 5mg BID (current); h/o Ablation: none; [id number] Score: 2  -Normal myocardial perfusion study  - 7-day Holter monitor shows 33% A-fib burden with RVR also shows sinus node dysfunction with sinus bradycardia, sinus pauses and junctional escape rhythm.   -He remains with minimal symptoms of occasional palpitations and fast heart rate several times a week with some dizziness and lightheadedness  - I will refer him to our electrophysiologist for consideration of permanent pacemaker implantation and subsequent rhythm control strategy with either antiarrhythmic agent or catheter-based intervention.  - Continue metoprolol succinate 25 mg daily for now.   - Continue Eliquis 5 mg twice a day for primary stroke prevention.     Essential hypertension  - Subclinical  -Continue to monitor     Patent foramen ovale  - Bidirectional shunt with bubble study reported on echo January 2021.   - Negative bubble study on most recent echo, April 2025  -No indication for closure in the absence of cryptogenic stroke, especially in patients with a history of atrial  fibrillation.   - No further action needed.     Carotid artery calcification  - Calcified plaque seen on carotid duplex study.  Less than 50% stenosis bilaterally internal carotid artery  -  on lipid panel May 2025  - Start rosuvastatin 20 mg daily    Return to Care:  6 months    Objective  VITALS  Vitals:    05/16/25 1516   BP: 142/70   Pulse: 55   SpO2: 97%       Weight  Vitals:    05/16/25 1516   Weight: 69.9 kg (154 lb)       Past Medical History  Medical History[1]    Past Surgical History  Surgical History[2]    Medications  Current Outpatient Medications   Medication Instructions    Eliquis 5 mg, oral, 2 times daily    esomeprazole (NEXIUM) 20 mg    metoprolol succinate XL (TOPROL-XL) 25 mg, oral, Daily, Do not crush or chew.       Allergies  Allergies[3]    Social History  Social History[4]    Family History  Family History[5]        PHYSICAL EXAM  Physical Exam  Vitals and nursing note reviewed.   Constitutional:       General: He is not in acute distress.  HENT:      Head: Normocephalic and atraumatic.      Mouth/Throat:      Mouth: Mucous membranes are moist.      Pharynx: Oropharynx is clear.   Eyes:      General: No scleral icterus.     Pupils: Pupils are equal, round, and reactive to light.   Cardiovascular:      Rate and Rhythm: Regular rhythm. Bradycardia present.      Pulses: Normal pulses.      Heart sounds: Normal heart sounds, S1 normal and S2 normal. No murmur heard.     No friction rub.   Pulmonary:      Effort: Pulmonary effort is normal.      Breath sounds: Normal breath sounds.   Abdominal:      General: Bowel sounds are normal. There is no distension.      Palpations: Abdomen is soft.      Tenderness: There is no abdominal tenderness.   Musculoskeletal:         General: Normal range of motion.      Cervical back: Normal range of motion and neck supple.      Right lower leg: No edema.      Left lower leg: No edema.   Skin:     General: Skin is warm and dry.      Capillary Refill:  Capillary refill takes less than 2 seconds.      Findings: No rash.   Neurological:      General: No focal deficit present.      Mental Status: He is alert.   Psychiatric:         Mood and Affect: Mood normal.         Behavior: Behavior normal.         Cardiovascular Labs  Lab Results   Component Value Date    HGB 13.1 (L) 04/11/2025    HGB 12.8 (L) 06/22/2023    HGB 11.8 (L) 12/01/2021     04/11/2025    WBC 7.4 04/11/2025     (L) 04/11/2025    K 4.9 04/11/2025    CREATININE 0.89 04/11/2025    CREATININE 1.01 06/22/2023    CREATININE 0.77 12/01/2021    BUN 12 04/11/2025    CALCIUM 9.4 04/11/2025       Echocardiogram  Results for orders placed during the hospital encounter of 04/18/25    Transthoracic echo ([place] [address]  Phone [phone], Fax [phone]    TRANSTHORACIC ECHOCARDIOGRAM REPORT    Patient Name:       JE YE VALERIA    Reading Physician:    [id number] Daniel Armstrong MD  Study Date:         4/18/2025           Ordering Provider:    [id number] ALEX RAND  MRN/PID:            [id number]            Fellow:  Accession#:         [id number]        Nurse:                Jory Waterman RN  Date of Birth/Age:  1960 / 64      Sonographer:          Guille Moreau RDCS  years  Gender Assigned at                     Additional Staff:  Birth:  Height:             167.64 cm           Admit Date:  Weight:             69.85 kg            Admission Status:     Outpatient  BSA / BMI:          1.79 m2 / 24.86     Department Location:  [facility]  kg/m2  Blood Pressure: 177 /100 mmHg    Study Type:    TRANSTHORACIC ECHO (TTE) COMPLETE  Diagnosis/ICD: Essential (primary) hypertension-I10; Patent foramen ovale-Q21.12  CPT Codes:     Echo Complete w Full Doppler-47067  Study Detail: The following Echo studies were performed: 2D, M-Mode, Doppler and  color flow. Agitated saline used as a contrast agent  for  intraseptal flow evaluation.      PHYSICIAN INTERPRETATION:  Left Ventricle: The left ventricular systolic function is normal, with a visually estimated ejection fraction of 60-65%. There are no regional wall motion abnormalities. The left ventricular cavity size is normal. There is mild increased septal and normal posterior left ventricular wall thickness. Spectral Doppler shows a normal pattern of left ventricular diastolic filling.  Left Atrium: The left atrial size is normal. A bubble study using agitated saline was performed. Bubble study is negative.  Right Ventricle: The right ventricle is normal in size. There is normal right ventricular global systolic function.  Right Atrium: The right atrial size is normal.  Aortic Valve: The aortic valve is trileaflet. The aortic valve dimensionless index is 0.85. There is trace aortic valve regurgitation. The peak instantaneous gradient of the aortic valve is 9 mmHg. The mean gradient of the aortic valve is 5 mmHg.  Mitral Valve: The mitral valve is mildly thickened. There is mild mitral valve regurgitation.  Tricuspid Valve: The tricuspid valve is structurally normal. There is trace tricuspid regurgitation. The Doppler estimated RVSP is within normal limits at 23.1 mmHg.  Pulmonic Valve: The pulmonic valve is structurally normal. There is physiologic pulmonic valve regurgitation.  Pericardium: Trivial pericardial effusion.  Aorta: The aortic root is normal.  In comparison to the previous echocardiogram(s): Compared with study dated 1/21/2021, no significant change.      CONCLUSIONS:  1. The left ventricular systolic function is normal, with a visually estimated ejection fraction of 60-65%.  2. There is normal right ventricular global systolic function.  3. Right ventricular systolic pressure is within normal limits.    QUANTITATIVE DATA SUMMARY:    2D MEASUREMENTS:             Normal Ranges:  Ao Root d:       3.50 cm     (2.0-3.7cm)  IVSd:            1.13 cm      (0.6-1.1cm)  LVPWd:           0.70 cm     (0.6-1.1cm)  LVIDd:           4.49 cm     (3.9-5.9cm)  LVIDs:           2.88 cm  LV Mass Index:   75.4 g/m2  LVEDV Index:     54.15 ml/m2  LV % FS          35.9 %      LEFT ATRIUM:                  Normal Ranges:  LA Vol A4C:        33.0 ml    (22+/-6mL/m2)  LA Vol A2C:        43.3 ml  LA Vol BP:         39.0 ml  LA Vol Index A4C:  18.4ml/m2  LA Vol Index A2C:  24.2 ml/m2  LA Vol Index BP:   21.8 ml/m2  LA Area A4C:       13.7 cm2  LA Area A2C:       15.2 cm2  LA Major Axis A4C: 4.8 cm  LA Major Axis A2C: 4.5 cm  LA Volume Index:   16.9 ml/m2  LA Vol A4C:        30.0 ml  LA Vol A2C:        41.6 ml  LA Vol Index BSA:  20.0 ml/m2      LV SYSTOLIC FUNCTION:  Normal Ranges:  EF-A4C View:    59 % (>=55%)  EF-A2C View:    58 %  EF-Biplane:     58 %  EF-Visual:      63 %  LV EF Reported: 63 %      AORTIC VALVE:                     Normal Ranges:  AoV Vmax:                1.53 m/s (<=1.7m/s)  AoV Peak P.4 mmHg (<20mmHg)  AoV Mean P.0 mmHg (1.7-11.5mmHg)  LVOT Max Capo:            1.08 m/s (<=1.1m/s)  AoV VTI:                 25.40 cm (18-25cm)  LVOT VTI:                21.70 cm  LVOT Diameter:           2.00 cm  (1.8-2.4cm)  AoV Area, VTI:           2.68 cm2 (2.5-5.5cm2)  AoV Area,Vmax:           2.22 cm2 (2.5-4.5cm2)  AoV Dimensionless Index: 0.85      RIGHT VENTRICLE:  RV Basal 4.14 cm  RV Mid   2.60 cm  RV Major 7.6 cm      TRICUSPID VALVE/RVSP:          Normal Ranges:  Peak TR Velocity:     2.24 m/s  RV Syst Pressure:     23 mmHg  (< 30mmHg)      [id number] Daniel Armstrong MD  Electronically signed on 2025 at 2:08:54 PM        ** Final **       The 10-year ASCVD risk score (Davis QUINTANA, et al., 2019) is: 20.3%    Values used to calculate the score:      Age: 64 years      Sex: Male      Is Non- : No      Diabetic: No      Tobacco smoker: No      Systolic Blood Pressure: 157 mmHg      Is BP treated: Yes      HDL Cholesterol:  56 mg/dL      Total Cholesterol: 262 mg/dL  Low Risk: <5%  Borderline Risk: 5%-7.4%  Intermediate Risk: 7.5% - 19.9%  High Risk: >20%    If your symptoms worsen or progress please go directory to your nearest emergency department for evaluation.     Thank you for this interesting clinical case and allowing me to participate in the care of this patient. Please reach me out if you have any questions or if you need any clarifications regarding this patient's care.    **Disclaimer: This note was dictated by speech recognition, and every effort has been made to prevent any error in transcription, however minor errors may be present**  ___________________________________________________  Eron Restrepo, MSN, CNP, ACNPC, CCRN  Advanced Practice Provider, Nurse Practitioner  Division of Cardiovascular Medicine  [facility]  [facility]         [1]   Past Medical History:  Diagnosis Date    Palpitations 01/23/2021    Heart palpitations    PFO (patent foramen ovale) ([facility]) 02/03/2021    Last Assessment & Plan: Formatting of this note might be different from the original. Incidental finding noted on echocardiogram.  We reviewed indications to fix it.  He does not need any of these requirements.  We talked about aspirin but given his ulcerative colitis, I chose not to initiate him on it.    Ulcerative colitis, unspecified, without complications (Multi) 12/01/2021    Ulcerative colitis   [2]   Past Surgical History:  Procedure Laterality Date    COLONOSCOPY W/ BIOPSIES  03/28/2025    dr. lozano    OTHER SURGICAL HISTORY  01/15/2021    Tonsillectomy    OTHER SURGICAL HISTORY  01/15/2021    Inguinal hernia repair   [3] No Known Allergies  [4]   Social History  Tobacco Use    Smoking status: Never    Smokeless tobacco: Never   Substance Use Topics    Alcohol use: Never    Drug use: Never   [5]   Family History  Problem Relation Name Age of Onset    Other (FOLLICULAR  LYMPHOMA) Mother      Dementia Father

## 2025-05-23 PROCEDURE — RXMED WILLOW AMBULATORY MEDICATION CHARGE

## 2025-05-24 ENCOUNTER — PHARMACY VISIT (OUTPATIENT)
Dept: PHARMACY | Facility: CLINIC | Age: 65
End: 2025-05-24
Payer: COMMERCIAL

## 2025-05-29 PROBLEM — I48.0 PAROXYSMAL ATRIAL FIBRILLATION (MULTI): Status: RESOLVED | Noted: 2025-04-18 | Resolved: 2025-05-29

## 2025-07-17 ENCOUNTER — APPOINTMENT (OUTPATIENT)
Dept: CARDIOLOGY | Facility: CLINIC | Age: 65
End: 2025-07-17
Payer: COMMERCIAL

## 2025-07-17 VITALS
OXYGEN SATURATION: 99 % | WEIGHT: 153 LBS | BODY MASS INDEX: 24.59 KG/M2 | HEART RATE: 58 BPM | SYSTOLIC BLOOD PRESSURE: 132 MMHG | DIASTOLIC BLOOD PRESSURE: 80 MMHG | HEIGHT: 66 IN

## 2025-07-17 DIAGNOSIS — I48.0 PAROXYSMAL ATRIAL FIBRILLATION (MULTI): ICD-10-CM
